# Patient Record
Sex: MALE | Race: ASIAN | Employment: OTHER | URBAN - METROPOLITAN AREA
[De-identification: names, ages, dates, MRNs, and addresses within clinical notes are randomized per-mention and may not be internally consistent; named-entity substitution may affect disease eponyms.]

---

## 2021-01-25 ENCOUNTER — TELEPHONE (OUTPATIENT)
Dept: PREADMISSION TESTING | Age: 76
End: 2021-01-25

## 2021-01-25 NOTE — TELEPHONE ENCOUNTER
Spoke with patient's daughter. She received a phone call from our hospital through a chinese  and was not able to understand the message. Patient speaks Darryl Reyes. His direct phone number is [phone].

## 2021-01-26 ENCOUNTER — TRANSCRIBE ORDER (OUTPATIENT)
Dept: REGISTRATION | Age: 76
End: 2021-01-26

## 2021-01-26 ENCOUNTER — HOSPITAL ENCOUNTER (OUTPATIENT)
Dept: PREADMISSION TESTING | Age: 76
Discharge: HOME | End: 2021-01-26
Payer: MEDICARE

## 2021-01-26 DIAGNOSIS — M17.11 OSTEOARTHRITIS OF RIGHT KNEE: Primary | ICD-10-CM

## 2021-01-26 DIAGNOSIS — M17.11 OSTEOARTHRITIS OF RIGHT KNEE: ICD-10-CM

## 2021-01-26 LAB
ALBUMIN SERPL-MCNC: 3.9 G/DL (ref 3.4–5)
ALBUMIN/GLOB SERPL: 1.2 {RATIO} (ref 0.8–1.7)
ALP SERPL-CCNC: 48 U/L (ref 45–117)
ALT SERPL-CCNC: 29 U/L (ref 16–61)
ANION GAP SERPL CALC-SCNC: 0 MMOL/L (ref 3–18)
APPEARANCE UR: CLEAR
APTT PPP: 40.1 SEC (ref 23–36.4)
AST SERPL-CCNC: 21 U/L (ref 10–38)
ATRIAL RATE: 59 BPM
BASOPHILS # BLD: 0 K/UL (ref 0–0.1)
BASOPHILS NFR BLD: 0 % (ref 0–2)
BILIRUB SERPL-MCNC: 0.5 MG/DL (ref 0.2–1)
BILIRUB UR QL: NEGATIVE
BUN SERPL-MCNC: 15 MG/DL (ref 7–18)
BUN/CREAT SERPL: 14 (ref 12–20)
CALCIUM SERPL-MCNC: 8.9 MG/DL (ref 8.5–10.1)
CALCULATED P AXIS, ECG09: 48 DEGREES
CALCULATED R AXIS, ECG10: 6 DEGREES
CALCULATED T AXIS, ECG11: -16 DEGREES
CHLORIDE SERPL-SCNC: 108 MMOL/L (ref 100–111)
CO2 SERPL-SCNC: 34 MMOL/L (ref 21–32)
COLOR UR: NORMAL
CREAT SERPL-MCNC: 1.07 MG/DL (ref 0.6–1.3)
DIAGNOSIS, 93000: NORMAL
DIFFERENTIAL METHOD BLD: ABNORMAL
EOSINOPHIL # BLD: 0.3 K/UL (ref 0–0.4)
EOSINOPHIL NFR BLD: 5 % (ref 0–5)
ERYTHROCYTE [DISTWIDTH] IN BLOOD BY AUTOMATED COUNT: 12.8 % (ref 11.6–14.5)
ERYTHROCYTE [SEDIMENTATION RATE] IN BLOOD: 4 MM/HR (ref 0–20)
EST. AVERAGE GLUCOSE BLD GHB EST-MCNC: 120 MG/DL
GLOBULIN SER CALC-MCNC: 3.3 G/DL (ref 2–4)
GLUCOSE SERPL-MCNC: 79 MG/DL (ref 74–99)
GLUCOSE UR STRIP.AUTO-MCNC: NEGATIVE MG/DL
HBA1C MFR BLD: 5.8 % (ref 4.2–5.6)
HCT VFR BLD AUTO: 41.7 % (ref 36–48)
HGB BLD-MCNC: 13.5 G/DL (ref 13–16)
HGB UR QL STRIP: NEGATIVE
INR PPP: 1 (ref 0.8–1.2)
KETONES UR QL STRIP.AUTO: NEGATIVE MG/DL
LEUKOCYTE ESTERASE UR QL STRIP.AUTO: NEGATIVE
LYMPHOCYTES # BLD: 1.9 K/UL (ref 0.9–3.6)
LYMPHOCYTES NFR BLD: 33 % (ref 21–52)
MCH RBC QN AUTO: 30.2 PG (ref 24–34)
MCHC RBC AUTO-ENTMCNC: 32.4 G/DL (ref 31–37)
MCV RBC AUTO: 93.3 FL (ref 74–97)
MONOCYTES # BLD: 0.6 K/UL (ref 0.05–1.2)
MONOCYTES NFR BLD: 10 % (ref 3–10)
NEUTS SEG # BLD: 2.9 K/UL (ref 1.8–8)
NEUTS SEG NFR BLD: 52 % (ref 40–73)
NITRITE UR QL STRIP.AUTO: NEGATIVE
P-R INTERVAL, ECG05: 160 MS
PH UR STRIP: 6.5 [PH] (ref 5–8)
PLATELET # BLD AUTO: 138 K/UL (ref 135–420)
PMV BLD AUTO: 10.6 FL (ref 9.2–11.8)
POTASSIUM SERPL-SCNC: 4.1 MMOL/L (ref 3.5–5.5)
PROT SERPL-MCNC: 7.2 G/DL (ref 6.4–8.2)
PROT UR STRIP-MCNC: NEGATIVE MG/DL
PROTHROMBIN TIME: 13.5 SEC (ref 11.5–15.2)
Q-T INTERVAL, ECG07: 408 MS
QRS DURATION, ECG06: 114 MS
QTC CALCULATION (BEZET), ECG08: 403 MS
RBC # BLD AUTO: 4.47 M/UL (ref 4.7–5.5)
SODIUM SERPL-SCNC: 142 MMOL/L (ref 136–145)
SP GR UR REFRACTOMETRY: 1.01 (ref 1–1.03)
UROBILINOGEN UR QL STRIP.AUTO: 1 EU/DL (ref 0.2–1)
VENTRICULAR RATE, ECG03: 59 BPM
WBC # BLD AUTO: 5.7 K/UL (ref 4.6–13.2)

## 2021-01-26 PROCEDURE — 83036 HEMOGLOBIN GLYCOSYLATED A1C: CPT

## 2021-01-26 PROCEDURE — 85652 RBC SED RATE AUTOMATED: CPT

## 2021-01-26 PROCEDURE — 87086 URINE CULTURE/COLONY COUNT: CPT

## 2021-01-26 PROCEDURE — 80053 COMPREHEN METABOLIC PANEL: CPT

## 2021-01-26 PROCEDURE — 81003 URINALYSIS AUTO W/O SCOPE: CPT

## 2021-01-26 PROCEDURE — 85610 PROTHROMBIN TIME: CPT

## 2021-01-26 PROCEDURE — 85730 THROMBOPLASTIN TIME PARTIAL: CPT

## 2021-01-26 PROCEDURE — 93005 ELECTROCARDIOGRAM TRACING: CPT

## 2021-01-26 PROCEDURE — 85025 COMPLETE CBC W/AUTO DIFF WBC: CPT

## 2021-01-27 LAB
BACTERIA SPEC CULT: ABNORMAL
BACTERIA SPEC CULT: ABNORMAL
BACTERIA SPEC CULT: NORMAL
SERVICE CMNT-IMP: ABNORMAL
SERVICE CMNT-IMP: NORMAL

## 2021-02-09 ENCOUNTER — HOSPITAL ENCOUNTER (OUTPATIENT)
Dept: PREADMISSION TESTING | Age: 76
Discharge: HOME | End: 2021-02-09
Payer: MEDICARE

## 2021-02-09 PROCEDURE — U0003 INFECTIOUS AGENT DETECTION BY NUCLEIC ACID (DNA OR RNA); SEVERE ACUTE RESPIRATORY SYNDROME CORONAVIRUS 2 (SARS-COV-2) (CORONAVIRUS DISEASE [COVID-19]), AMPLIFIED PROBE TECHNIQUE, MAKING USE OF HIGH THROUGHPUT TECHNOLOGIES AS DESCRIBED BY CMS-2020-01-R: HCPCS

## 2021-02-10 LAB — SARS-COV-2, COV2NT: NOT DETECTED

## 2021-02-11 PROBLEM — M17.11 OSTEOARTHRITIS OF RIGHT KNEE: Chronic | Status: ACTIVE | Noted: 2021-02-11

## 2021-02-11 NOTE — H&P
[address] [facility] [address] Medicine  History and Physical Exam    Patient: In Kassi Banuelos MRN: [id number]  SSN: [id number]    YOB: 1945  Age: 67 y.o. Sex: male      Subjective:      Chief Complaint: Right knee pain    History of Present Illness:  Patient complains of pain to the right knee and difficulty ambulating, which has progressively worsened over several months. X-rays showed osteoarthritis of the joint. The patient's pain has persisted and progressed despite conservative treatments and therapies. The patient has been previously treated with medications/injections. The patient has at this time opted for surgical intervention. Past Medical History:   Diagnosis Date    Arthritis     GERD (gastroesophageal reflux disease)     High cholesterol     controlled with medication    Osteoarthritis of right knee 2/11/2021     No past surgical history on file. Social History     Occupational History    Not on file   Tobacco Use    Smoking status: Never Smoker    Smokeless tobacco: Never Used   Substance and Sexual Activity    Alcohol use: Not Currently    Drug use: Not Currently    Sexual activity: Not on file     Prior to Admission medications    Medication Sig Start Date End Date Taking? Authorizing Provider   omeprazole (PRILOSEC) 20 mg capsule TAKE 1 CAPSULE BY MOUTH EVERY DAY 1/18/21   Provider, Historical   pravastatin (PRAVACHOL) 80 mg tablet Take 80 mg by mouth daily. 1/18/21   Provider, Historical   multivitamin (ONE A DAY) tablet Take 1 Tab by mouth daily. Provider, Historical   krill oil 500 mg cap Take  by mouth. Provider, Historical       Allergies: No Known Allergies     Review of Systems:  Pertinent items are noted in the History of Present Illness. Objective:       Physical Exam:  HEENT: Normocephalic, atraumatic  Lungs:  Clear to auscultation  Heart:   Regular rate and rhythm  Abdomen: Soft  Extremities:  Pain with range of motion of the right knee. Active ROM limited due to pain. Tenderness generalized. Crepitus present. Antalgic gait. Assessment:      Arthritis of the right knee. Plan:       Proceed with scheduled RIGHT TOTAL KNEE ARTHROPLASTY. The various methods of treatment have been discussed with the patient and family. After consideration of risks, benefits, and other options for treatment, the patient has consented to surgical interventions. Questions were answered and preoperative teaching was done by Dr Laila Tamez.      Signed By: Jeromy Gallardo     February 11, 2021

## 2021-02-15 ENCOUNTER — HOME HEALTH ADMISSION (OUTPATIENT)
Dept: HOME HEALTH SERVICES | Facility: HOME HEALTH | Age: 76
End: 2021-02-15
Payer: MEDICARE

## 2021-02-15 ENCOUNTER — HOSPITAL ENCOUNTER (OUTPATIENT)
Age: 76
Discharge: HOME HEALTH | End: 2021-02-15
Attending: ORTHOPAEDIC SURGERY | Admitting: ORTHOPAEDIC SURGERY
Payer: MEDICARE

## 2021-02-15 ENCOUNTER — ANESTHESIA EVENT (OUTPATIENT)
Dept: SURGERY | Age: 76
End: 2021-02-15
Payer: MEDICARE

## 2021-02-15 ENCOUNTER — APPOINTMENT (OUTPATIENT)
Dept: GENERAL RADIOLOGY | Age: 76
End: 2021-02-15
Attending: PHYSICIAN ASSISTANT
Payer: MEDICARE

## 2021-02-15 ENCOUNTER — ANESTHESIA (OUTPATIENT)
Dept: SURGERY | Age: 76
End: 2021-02-15
Payer: MEDICARE

## 2021-02-15 VITALS
DIASTOLIC BLOOD PRESSURE: 83 MMHG | TEMPERATURE: 97.1 F | WEIGHT: 178.4 LBS | HEART RATE: 87 BPM | OXYGEN SATURATION: 95 % | HEIGHT: 69 IN | RESPIRATION RATE: 12 BRPM | SYSTOLIC BLOOD PRESSURE: 150 MMHG | BODY MASS INDEX: 26.42 KG/M2

## 2021-02-15 DIAGNOSIS — M17.11 PRIMARY OSTEOARTHRITIS OF RIGHT KNEE: Primary | Chronic | ICD-10-CM

## 2021-02-15 LAB
ABO + RH BLD: NORMAL
BLOOD GROUP ANTIBODIES SERPL: NORMAL
GLUCOSE BLD STRIP.AUTO-MCNC: 104 MG/DL (ref 70–110)
SPECIMEN EXP DATE BLD: NORMAL

## 2021-02-15 PROCEDURE — 77030037713 HC CLOSR DEV INCIS ZIP STRY -B: Performed by: ORTHOPAEDIC SURGERY

## 2021-02-15 PROCEDURE — 77010033678 HC OXYGEN DAILY

## 2021-02-15 PROCEDURE — 77030003666 HC NDL SPINAL BD -A: Performed by: ORTHOPAEDIC SURGERY

## 2021-02-15 PROCEDURE — 97535 SELF CARE MNGMENT TRAINING: CPT

## 2021-02-15 PROCEDURE — 77030002934 HC SUT MCRYL J&J -B: Performed by: ORTHOPAEDIC SURGERY

## 2021-02-15 PROCEDURE — 77030033263 HC DRSG MEPILEX 16-48IN BORD MOLN -B: Performed by: ORTHOPAEDIC SURGERY

## 2021-02-15 PROCEDURE — C1776 JOINT DEVICE (IMPLANTABLE): HCPCS | Performed by: ORTHOPAEDIC SURGERY

## 2021-02-15 PROCEDURE — 74011250636 HC RX REV CODE- 250/636: Performed by: REGISTERED NURSE

## 2021-02-15 PROCEDURE — 74011250636 HC RX REV CODE- 250/636: Performed by: ORTHOPAEDIC SURGERY

## 2021-02-15 PROCEDURE — 74011000250 HC RX REV CODE- 250: Performed by: REGISTERED NURSE

## 2021-02-15 PROCEDURE — 77030016060 HC NDL NRV BLK TELE -A: Performed by: SPECIALIST

## 2021-02-15 PROCEDURE — 76060000034 HC ANESTHESIA 1.5 TO 2 HR: Performed by: ORTHOPAEDIC SURGERY

## 2021-02-15 PROCEDURE — 74011250637 HC RX REV CODE- 250/637: Performed by: PHYSICIAN ASSISTANT

## 2021-02-15 PROCEDURE — 77030012893: Performed by: ORTHOPAEDIC SURGERY

## 2021-02-15 PROCEDURE — 77030038010: Performed by: ORTHOPAEDIC SURGERY

## 2021-02-15 PROCEDURE — 97161 PT EVAL LOW COMPLEX 20 MIN: CPT

## 2021-02-15 PROCEDURE — 64450 NJX AA&/STRD OTHER PN/BRANCH: CPT | Performed by: SPECIALIST

## 2021-02-15 PROCEDURE — 86901 BLOOD TYPING SEROLOGIC RH(D): CPT

## 2021-02-15 PROCEDURE — 74011000258 HC RX REV CODE- 258: Performed by: REGISTERED NURSE

## 2021-02-15 PROCEDURE — 2709999900 HC NON-CHARGEABLE SUPPLY: Performed by: ORTHOPAEDIC SURGERY

## 2021-02-15 PROCEDURE — 77030011628: Performed by: ORTHOPAEDIC SURGERY

## 2021-02-15 PROCEDURE — 77030013708 HC HNDPC SUC IRR PULS STRY –B: Performed by: ORTHOPAEDIC SURGERY

## 2021-02-15 PROCEDURE — 77030020782 HC GWN BAIR PAWS FLX 3M -B: Performed by: ORTHOPAEDIC SURGERY

## 2021-02-15 PROCEDURE — 77030039760: Performed by: ORTHOPAEDIC SURGERY

## 2021-02-15 PROCEDURE — 82962 GLUCOSE BLOOD TEST: CPT

## 2021-02-15 PROCEDURE — 97165 OT EVAL LOW COMPLEX 30 MIN: CPT

## 2021-02-15 PROCEDURE — 76010000153 HC OR TIME 1.5 TO 2 HR: Performed by: ORTHOPAEDIC SURGERY

## 2021-02-15 PROCEDURE — 74011000258 HC RX REV CODE- 258: Performed by: ORTHOPAEDIC SURGERY

## 2021-02-15 PROCEDURE — 76942 ECHO GUIDE FOR BIOPSY: CPT | Performed by: ORTHOPAEDIC SURGERY

## 2021-02-15 PROCEDURE — 77030031139 HC SUT VCRL2 J&J -A: Performed by: ORTHOPAEDIC SURGERY

## 2021-02-15 PROCEDURE — 74011250637 HC RX REV CODE- 250/637: Performed by: SPECIALIST

## 2021-02-15 PROCEDURE — 97116 GAIT TRAINING THERAPY: CPT

## 2021-02-15 PROCEDURE — 77030034694 HC SCPL CANADY PLSM DISP USMD -E: Performed by: ORTHOPAEDIC SURGERY

## 2021-02-15 PROCEDURE — 74011250636 HC RX REV CODE- 250/636: Performed by: PHYSICIAN ASSISTANT

## 2021-02-15 PROCEDURE — 74011250636 HC RX REV CODE- 250/636: Performed by: SPECIALIST

## 2021-02-15 PROCEDURE — 74011000250 HC RX REV CODE- 250: Performed by: SPECIALIST

## 2021-02-15 PROCEDURE — C1713 ANCHOR/SCREW BN/BN,TIS/BN: HCPCS | Performed by: ORTHOPAEDIC SURGERY

## 2021-02-15 PROCEDURE — 77030027138 HC INCENT SPIROMETER -A: Performed by: ORTHOPAEDIC SURGERY

## 2021-02-15 PROCEDURE — 74011000250 HC RX REV CODE- 250: Performed by: ORTHOPAEDIC SURGERY

## 2021-02-15 PROCEDURE — 74011250637 HC RX REV CODE- 250/637: Performed by: ORTHOPAEDIC SURGERY

## 2021-02-15 PROCEDURE — 73560 X-RAY EXAM OF KNEE 1 OR 2: CPT

## 2021-02-15 PROCEDURE — 76210000016 HC OR PH I REC 1 TO 1.5 HR: Performed by: ORTHOPAEDIC SURGERY

## 2021-02-15 PROCEDURE — 36415 COLL VENOUS BLD VENIPUNCTURE: CPT

## 2021-02-15 DEVICE — SIZE 6 9MM TIBIAL INSERT CR
Type: IMPLANTABLE DEVICE | Site: KNEE | Status: FUNCTIONAL
Brand: BKS E-VITALIZE

## 2021-02-15 DEVICE — CEMENT BNE 40GM FULL DOSE PMMA W/O ANTIBIO HI VISC N RADPQ: Type: IMPLANTABLE DEVICE | Site: KNEE | Status: FUNCTIONAL

## 2021-02-15 DEVICE — 35MM PATELLA, VITAMIN E
Type: IMPLANTABLE DEVICE | Site: KNEE | Status: FUNCTIONAL
Brand: BKS E-VITALIZE

## 2021-02-15 DEVICE — KNEE K1 TOT HEMI STD CEM IMPL CAPPED K1 OD: Type: IMPLANTABLE DEVICE | Site: KNEE | Status: FUNCTIONAL

## 2021-02-15 DEVICE — RT SIZE 6 FEMORAL CR NONPOROUS
Type: IMPLANTABLE DEVICE | Site: KNEE | Status: FUNCTIONAL
Brand: BKS TRIMAX

## 2021-02-15 DEVICE — SIZE 6 TIBIAL TRAY NONPOROUS
Type: IMPLANTABLE DEVICE | Site: KNEE | Status: FUNCTIONAL
Brand: BALANCED KNEE SYSTEM

## 2021-02-15 RX ORDER — HYDROMORPHONE HYDROCHLORIDE 1 MG/ML
0.5 INJECTION, SOLUTION INTRAMUSCULAR; INTRAVENOUS; SUBCUTANEOUS
Status: DISCONTINUED | OUTPATIENT
Start: 2021-02-15 | End: 2021-02-15 | Stop reason: HOSPADM

## 2021-02-15 RX ORDER — TRANEXAMIC ACID 650 1/1
1950 TABLET ORAL ONCE
Status: COMPLETED | OUTPATIENT
Start: 2021-02-15 | End: 2021-02-15

## 2021-02-15 RX ORDER — NALOXONE HYDROCHLORIDE 0.4 MG/ML
0.1 INJECTION, SOLUTION INTRAMUSCULAR; INTRAVENOUS; SUBCUTANEOUS
Status: DISCONTINUED | OUTPATIENT
Start: 2021-02-15 | End: 2021-02-15 | Stop reason: HOSPADM

## 2021-02-15 RX ORDER — CEFADROXIL 500 MG/1
500 CAPSULE ORAL 2 TIMES DAILY
Qty: 10 CAP | Refills: 0 | Status: SHIPPED | OUTPATIENT
Start: 2021-02-15 | End: 2021-02-20

## 2021-02-15 RX ORDER — ASPIRIN 81 MG/1
81 TABLET ORAL 2 TIMES DAILY
Qty: 42 TAB | Refills: 0 | Status: SHIPPED | OUTPATIENT
Start: 2021-02-15 | End: 2021-03-08

## 2021-02-15 RX ORDER — ACETAMINOPHEN 325 MG/1
650 TABLET ORAL EVERY 6 HOURS
Status: DISCONTINUED | OUTPATIENT
Start: 2021-02-15 | End: 2021-02-15 | Stop reason: HOSPADM

## 2021-02-15 RX ORDER — EPHEDRINE SULFATE/0.9% NACL/PF 50 MG/5 ML
SYRINGE (ML) INTRAVENOUS AS NEEDED
Status: DISCONTINUED | OUTPATIENT
Start: 2021-02-15 | End: 2021-02-15 | Stop reason: HOSPADM

## 2021-02-15 RX ORDER — HYDROMORPHONE HYDROCHLORIDE 2 MG/ML
INJECTION, SOLUTION INTRAMUSCULAR; INTRAVENOUS; SUBCUTANEOUS AS NEEDED
Status: DISCONTINUED | OUTPATIENT
Start: 2021-02-15 | End: 2021-02-15 | Stop reason: HOSPADM

## 2021-02-15 RX ORDER — PROPOFOL 10 MG/ML
INJECTION, EMULSION INTRAVENOUS AS NEEDED
Status: DISCONTINUED | OUTPATIENT
Start: 2021-02-15 | End: 2021-02-15 | Stop reason: HOSPADM

## 2021-02-15 RX ORDER — VANCOMYCIN HYDROCHLORIDE
1250 EVERY 12 HOURS
Status: DISCONTINUED | OUTPATIENT
Start: 2021-02-16 | End: 2021-02-15 | Stop reason: HOSPADM

## 2021-02-15 RX ORDER — OXYCODONE HYDROCHLORIDE 5 MG/1
5 TABLET ORAL
Qty: 42 TAB | Refills: 0 | Status: SHIPPED | OUTPATIENT
Start: 2021-02-15 | End: 2021-02-22

## 2021-02-15 RX ORDER — ONDANSETRON 2 MG/ML
4 INJECTION INTRAMUSCULAR; INTRAVENOUS
Status: DISCONTINUED | OUTPATIENT
Start: 2021-02-15 | End: 2021-02-15 | Stop reason: HOSPADM

## 2021-02-15 RX ORDER — KETAMINE HCL IN 0.9 % NACL 50 MG/5 ML
SYRINGE (ML) INTRAVENOUS AS NEEDED
Status: DISCONTINUED | OUTPATIENT
Start: 2021-02-15 | End: 2021-02-15 | Stop reason: HOSPADM

## 2021-02-15 RX ORDER — NALOXONE HYDROCHLORIDE 0.4 MG/ML
0.4 INJECTION, SOLUTION INTRAMUSCULAR; INTRAVENOUS; SUBCUTANEOUS AS NEEDED
Status: DISCONTINUED | OUTPATIENT
Start: 2021-02-15 | End: 2021-02-15 | Stop reason: HOSPADM

## 2021-02-15 RX ORDER — ROPIVACAINE HYDROCHLORIDE 5 MG/ML
INJECTION, SOLUTION EPIDURAL; INFILTRATION; PERINEURAL
Status: SHIPPED | OUTPATIENT
Start: 2021-02-15 | End: 2021-02-15

## 2021-02-15 RX ORDER — DEXMEDETOMIDINE HYDROCHLORIDE 100 UG/ML
INJECTION, SOLUTION INTRAVENOUS AS NEEDED
Status: DISCONTINUED | OUTPATIENT
Start: 2021-02-15 | End: 2021-02-15 | Stop reason: HOSPADM

## 2021-02-15 RX ORDER — VANCOMYCIN HYDROCHLORIDE
1250 ONCE
Status: COMPLETED | OUTPATIENT
Start: 2021-02-15 | End: 2021-02-15

## 2021-02-15 RX ORDER — CEFAZOLIN SODIUM/WATER 2 G/20 ML
2 SYRINGE (ML) INTRAVENOUS EVERY 8 HOURS
Status: DISCONTINUED | OUTPATIENT
Start: 2021-02-15 | End: 2021-02-15 | Stop reason: HOSPADM

## 2021-02-15 RX ORDER — DOCUSATE SODIUM 100 MG/1
100 CAPSULE, LIQUID FILLED ORAL DAILY
Status: DISCONTINUED | OUTPATIENT
Start: 2021-02-16 | End: 2021-02-15 | Stop reason: HOSPADM

## 2021-02-15 RX ORDER — CEFAZOLIN SODIUM/WATER 2 G/20 ML
2 SYRINGE (ML) INTRAVENOUS ONCE
Status: COMPLETED | OUTPATIENT
Start: 2021-02-15 | End: 2021-02-15

## 2021-02-15 RX ORDER — ZOLPIDEM TARTRATE 5 MG/1
5-10 TABLET ORAL
Status: DISCONTINUED | OUTPATIENT
Start: 2021-02-15 | End: 2021-02-15 | Stop reason: HOSPADM

## 2021-02-15 RX ORDER — SODIUM CHLORIDE 0.9 % (FLUSH) 0.9 %
5-40 SYRINGE (ML) INJECTION EVERY 8 HOURS
Status: DISCONTINUED | OUTPATIENT
Start: 2021-02-15 | End: 2021-02-15 | Stop reason: HOSPADM

## 2021-02-15 RX ORDER — FENTANYL CITRATE 50 UG/ML
25 INJECTION, SOLUTION INTRAMUSCULAR; INTRAVENOUS
Status: DISCONTINUED | OUTPATIENT
Start: 2021-02-15 | End: 2021-02-15 | Stop reason: HOSPADM

## 2021-02-15 RX ORDER — CELECOXIB 100 MG/1
200 CAPSULE ORAL ONCE
Status: COMPLETED | OUTPATIENT
Start: 2021-02-15 | End: 2021-02-15

## 2021-02-15 RX ORDER — OXYCODONE HYDROCHLORIDE 5 MG/1
5-10 TABLET ORAL
Status: DISCONTINUED | OUTPATIENT
Start: 2021-02-15 | End: 2021-02-15 | Stop reason: HOSPADM

## 2021-02-15 RX ORDER — METOCLOPRAMIDE HYDROCHLORIDE 5 MG/ML
INJECTION INTRAMUSCULAR; INTRAVENOUS AS NEEDED
Status: DISCONTINUED | OUTPATIENT
Start: 2021-02-15 | End: 2021-02-15 | Stop reason: HOSPADM

## 2021-02-15 RX ORDER — ONDANSETRON 2 MG/ML
4 INJECTION INTRAMUSCULAR; INTRAVENOUS ONCE
Status: DISCONTINUED | OUTPATIENT
Start: 2021-02-15 | End: 2021-02-15 | Stop reason: HOSPADM

## 2021-02-15 RX ORDER — SODIUM CHLORIDE 9 MG/ML
300 INJECTION, SOLUTION INTRAVENOUS CONTINUOUS
Status: DISCONTINUED | OUTPATIENT
Start: 2021-02-15 | End: 2021-02-15 | Stop reason: HOSPADM

## 2021-02-15 RX ORDER — SODIUM CHLORIDE 9 MG/ML
125 INJECTION, SOLUTION INTRAVENOUS CONTINUOUS
Status: DISCONTINUED | OUTPATIENT
Start: 2021-02-15 | End: 2021-02-15 | Stop reason: HOSPADM

## 2021-02-15 RX ORDER — MELOXICAM 7.5 MG/1
7.5 TABLET ORAL 2 TIMES DAILY
Qty: 28 TAB | Refills: 0 | Status: SHIPPED | OUTPATIENT
Start: 2021-02-15 | End: 2021-03-01

## 2021-02-15 RX ORDER — DEXAMETHASONE SODIUM PHOSPHATE 4 MG/ML
8 INJECTION, SOLUTION INTRA-ARTICULAR; INTRALESIONAL; INTRAMUSCULAR; INTRAVENOUS; SOFT TISSUE ONCE
Status: COMPLETED | OUTPATIENT
Start: 2021-02-15 | End: 2021-02-15

## 2021-02-15 RX ORDER — SODIUM CHLORIDE 0.9 % (FLUSH) 0.9 %
5-40 SYRINGE (ML) INJECTION AS NEEDED
Status: DISCONTINUED | OUTPATIENT
Start: 2021-02-15 | End: 2021-02-15 | Stop reason: HOSPADM

## 2021-02-15 RX ORDER — LIDOCAINE HYDROCHLORIDE 20 MG/ML
INJECTION, SOLUTION EPIDURAL; INFILTRATION; INTRACAUDAL; PERINEURAL AS NEEDED
Status: DISCONTINUED | OUTPATIENT
Start: 2021-02-15 | End: 2021-02-15 | Stop reason: HOSPADM

## 2021-02-15 RX ORDER — ROPIVACAINE HYDROCHLORIDE 2 MG/ML
INJECTION, SOLUTION EPIDURAL; INFILTRATION; PERINEURAL AS NEEDED
Status: DISCONTINUED | OUTPATIENT
Start: 2021-02-15 | End: 2021-02-15 | Stop reason: HOSPADM

## 2021-02-15 RX ORDER — MIDAZOLAM HYDROCHLORIDE 1 MG/ML
INJECTION, SOLUTION INTRAMUSCULAR; INTRAVENOUS AS NEEDED
Status: DISCONTINUED | OUTPATIENT
Start: 2021-02-15 | End: 2021-02-15 | Stop reason: HOSPADM

## 2021-02-15 RX ORDER — METOCLOPRAMIDE HYDROCHLORIDE 5 MG/ML
10 INJECTION INTRAMUSCULAR; INTRAVENOUS
Status: DISCONTINUED | OUTPATIENT
Start: 2021-02-15 | End: 2021-02-15 | Stop reason: HOSPADM

## 2021-02-15 RX ORDER — ROPIVACAINE HYDROCHLORIDE 5 MG/ML
INJECTION, SOLUTION EPIDURAL; INFILTRATION; PERINEURAL AS NEEDED
Status: DISCONTINUED | OUTPATIENT
Start: 2021-02-15 | End: 2021-02-15 | Stop reason: HOSPADM

## 2021-02-15 RX ORDER — DIPHENHYDRAMINE HYDROCHLORIDE 50 MG/ML
12.5 INJECTION, SOLUTION INTRAMUSCULAR; INTRAVENOUS
Status: DISCONTINUED | OUTPATIENT
Start: 2021-02-15 | End: 2021-02-15 | Stop reason: HOSPADM

## 2021-02-15 RX ORDER — KETOROLAC TROMETHAMINE 15 MG/ML
15 INJECTION, SOLUTION INTRAMUSCULAR; INTRAVENOUS EVERY 6 HOURS
Status: DISCONTINUED | OUTPATIENT
Start: 2021-02-15 | End: 2021-02-15 | Stop reason: HOSPADM

## 2021-02-15 RX ORDER — GLYCOPYRROLATE 0.2 MG/ML
INJECTION INTRAMUSCULAR; INTRAVENOUS AS NEEDED
Status: DISCONTINUED | OUTPATIENT
Start: 2021-02-15 | End: 2021-02-15 | Stop reason: HOSPADM

## 2021-02-15 RX ORDER — SODIUM CHLORIDE, SODIUM LACTATE, POTASSIUM CHLORIDE, CALCIUM CHLORIDE 600; 310; 30; 20 MG/100ML; MG/100ML; MG/100ML; MG/100ML
50 INJECTION, SOLUTION INTRAVENOUS CONTINUOUS
Status: DISCONTINUED | OUTPATIENT
Start: 2021-02-15 | End: 2021-02-15 | Stop reason: HOSPADM

## 2021-02-15 RX ORDER — SODIUM CHLORIDE, SODIUM LACTATE, POTASSIUM CHLORIDE, CALCIUM CHLORIDE 600; 310; 30; 20 MG/100ML; MG/100ML; MG/100ML; MG/100ML
125 INJECTION, SOLUTION INTRAVENOUS CONTINUOUS
Status: DISCONTINUED | OUTPATIENT
Start: 2021-02-15 | End: 2021-02-15 | Stop reason: HOSPADM

## 2021-02-15 RX ORDER — ASPIRIN 81 MG/1
81 TABLET ORAL 2 TIMES DAILY
Status: DISCONTINUED | OUTPATIENT
Start: 2021-02-15 | End: 2021-02-15 | Stop reason: HOSPADM

## 2021-02-15 RX ORDER — PANTOPRAZOLE SODIUM 40 MG/1
40 TABLET, DELAYED RELEASE ORAL DAILY
Status: DISCONTINUED | OUTPATIENT
Start: 2021-02-15 | End: 2021-02-15 | Stop reason: HOSPADM

## 2021-02-15 RX ORDER — ONDANSETRON 2 MG/ML
INJECTION INTRAMUSCULAR; INTRAVENOUS AS NEEDED
Status: DISCONTINUED | OUTPATIENT
Start: 2021-02-15 | End: 2021-02-15 | Stop reason: HOSPADM

## 2021-02-15 RX ORDER — LANOLIN ALCOHOL/MO/W.PET/CERES
1 CREAM (GRAM) TOPICAL 3 TIMES DAILY
Status: DISCONTINUED | OUTPATIENT
Start: 2021-02-15 | End: 2021-02-15 | Stop reason: HOSPADM

## 2021-02-15 RX ORDER — ACETAMINOPHEN 500 MG
1000 TABLET ORAL ONCE
Status: COMPLETED | OUTPATIENT
Start: 2021-02-15 | End: 2021-02-15

## 2021-02-15 RX ADMIN — PHENYLEPHRINE HYDROCHLORIDE 100 MCG: 10 INJECTION INTRAVENOUS at 13:53

## 2021-02-15 RX ADMIN — Medication 30 MG: at 12:52

## 2021-02-15 RX ADMIN — ONDANSETRON HYDROCHLORIDE 4 MG: 2 INJECTION INTRAMUSCULAR; INTRAVENOUS at 13:10

## 2021-02-15 RX ADMIN — Medication 20 MG: at 13:17

## 2021-02-15 RX ADMIN — DEXMEDETOMIDINE HYDROCHLORIDE 20 MCG: 100 INJECTION, SOLUTION INTRAVENOUS at 12:09

## 2021-02-15 RX ADMIN — MIDAZOLAM 2 MG: 1 INJECTION INTRAMUSCULAR; INTRAVENOUS at 12:05

## 2021-02-15 RX ADMIN — SODIUM CHLORIDE, SODIUM LACTATE, POTASSIUM CHLORIDE, AND CALCIUM CHLORIDE 1000 ML: 600; 310; 30; 20 INJECTION, SOLUTION INTRAVENOUS at 10:52

## 2021-02-15 RX ADMIN — PHENYLEPHRINE HYDROCHLORIDE 200 MCG: 10 INJECTION INTRAVENOUS at 13:59

## 2021-02-15 RX ADMIN — PROPOFOL 150 MG: 10 INJECTION, EMULSION INTRAVENOUS at 12:52

## 2021-02-15 RX ADMIN — SODIUM CHLORIDE, SODIUM LACTATE, POTASSIUM CHLORIDE, AND CALCIUM CHLORIDE 125 ML/HR: 600; 310; 30; 20 INJECTION, SOLUTION INTRAVENOUS at 10:52

## 2021-02-15 RX ADMIN — DEXMEDETOMIDINE HYDROCHLORIDE 8 MCG: 100 INJECTION, SOLUTION INTRAVENOUS at 13:30

## 2021-02-15 RX ADMIN — HYDROMORPHONE HYDROCHLORIDE 0.5 MG: 2 INJECTION, SOLUTION INTRAMUSCULAR; INTRAVENOUS; SUBCUTANEOUS at 13:28

## 2021-02-15 RX ADMIN — ROPIVACAINE HYDROCHLORIDE 22 ML: 5 INJECTION, SOLUTION EPIDURAL; INFILTRATION; PERINEURAL at 12:20

## 2021-02-15 RX ADMIN — TRANEXAMIC ACID 1950 MG: 650 TABLET ORAL at 10:40

## 2021-02-15 RX ADMIN — DEXAMETHASONE SODIUM PHOSPHATE 8 MG: 4 INJECTION, SOLUTION INTRAMUSCULAR; INTRAVENOUS at 10:52

## 2021-02-15 RX ADMIN — PHENYLEPHRINE HYDROCHLORIDE 100 MCG: 10 INJECTION INTRAVENOUS at 14:09

## 2021-02-15 RX ADMIN — GLYCOPYRROLATE 0.2 MG: 0.2 INJECTION INTRAMUSCULAR; INTRAVENOUS at 13:02

## 2021-02-15 RX ADMIN — ACETAMINOPHEN 1000 MG: 500 TABLET ORAL at 10:40

## 2021-02-15 RX ADMIN — VANCOMYCIN HYDROCHLORIDE 1250 MG: 10 INJECTION, POWDER, LYOPHILIZED, FOR SOLUTION INTRAVENOUS at 12:47

## 2021-02-15 RX ADMIN — CELECOXIB 200 MG: 100 CAPSULE ORAL at 11:36

## 2021-02-15 RX ADMIN — ROPIVACAINE HYDROCHLORIDE 22 ML: 5 INJECTION, SOLUTION EPIDURAL; INFILTRATION; PERINEURAL at 12:09

## 2021-02-15 RX ADMIN — HYDROMORPHONE HYDROCHLORIDE 0.5 MG: 2 INJECTION, SOLUTION INTRAMUSCULAR; INTRAVENOUS; SUBCUTANEOUS at 13:19

## 2021-02-15 RX ADMIN — CEFAZOLIN 2 G: 10 INJECTION, POWDER, FOR SOLUTION INTRAVENOUS at 12:19

## 2021-02-15 RX ADMIN — ROPIVACAINE HYDROCHLORIDE 20 ML: 2 INJECTION, SOLUTION EPIDURAL; INFILTRATION; PERINEURAL at 12:16

## 2021-02-15 RX ADMIN — METOCLOPRAMIDE 5 MG: 5 INJECTION, SOLUTION INTRAMUSCULAR; INTRAVENOUS at 13:03

## 2021-02-15 RX ADMIN — SODIUM CHLORIDE, SODIUM LACTATE, POTASSIUM CHLORIDE, AND CALCIUM CHLORIDE: 600; 310; 30; 20 INJECTION, SOLUTION INTRAVENOUS at 13:25

## 2021-02-15 RX ADMIN — DEXMEDETOMIDINE HYDROCHLORIDE 8 MCG: 100 INJECTION, SOLUTION INTRAVENOUS at 13:22

## 2021-02-15 RX ADMIN — DEXMEDETOMIDINE HYDROCHLORIDE 8 MCG: 100 INJECTION, SOLUTION INTRAVENOUS at 13:15

## 2021-02-15 RX ADMIN — LIDOCAINE HYDROCHLORIDE 100 MG: 20 INJECTION, SOLUTION EPIDURAL; INFILTRATION; INTRACAUDAL; PERINEURAL at 12:52

## 2021-02-15 RX ADMIN — Medication 5 MG: at 14:16

## 2021-02-15 RX ADMIN — MIDAZOLAM 2 MG: 1 INJECTION INTRAMUSCULAR; INTRAVENOUS at 12:52

## 2021-02-15 RX ADMIN — PANTOPRAZOLE SODIUM 40 MG: 40 TABLET, DELAYED RELEASE ORAL at 10:40

## 2021-02-15 NOTE — ANESTHESIA PREPROCEDURE EVALUATION
Relevant Problems   No relevant active problems       Anesthetic History   No history of anesthetic complications            Review of Systems / Medical History  Patient summary reviewed, nursing notes reviewed and pertinent labs reviewed    Pulmonary  Within defined limits                 Neuro/Psych   Within defined limits           Cardiovascular              Hyperlipidemia    Exercise tolerance: >4 METS     GI/Hepatic/Renal     GERD           Endo/Other        Arthritis     Other Findings              Physical Exam    Airway  Mallampati: II  TM Distance: 4 - 6 cm  Neck ROM: normal range of motion   Mouth opening: Normal     Cardiovascular               Dental  No notable dental hx       Pulmonary                 Abdominal         Other Findings            Anesthetic Plan    ASA: 2  Anesthesia type: general      Post-op pain plan if not by surgeon: peripheral nerve block single      Anesthetic plan and risks discussed with: Patient      He understands English quite well and has no interest in Spinal.  Adductor Canal Block and iPACK block discussed with patient including risks/benefits - infection, nerve injury, bleeding, failed blocks - he wishes to proceed. I found an old EKG in [facility] which looks similar. Patient is active without cardiac symptoms and appears well optimized for procedure.

## 2021-02-15 NOTE — PERIOP NOTES
TRANSFER - OUT REPORT:    Verbal report given to RYDER Romero (name) on In [address] [facility] [address]  being transferred to [facility](unit) for routine post - op       Report consisted of patients Situation, Background, Assessment and   Recommendations(SBAR). Information from the following report(s) SBAR, Kardex, OR Summary, MAR and Cardiac Rhythm NSR was reviewed with the receiving nurse. Lines:   Peripheral IV 02/15/21 Right; Inner Forearm (Active)   Site Assessment Clean, dry, & intact 02/15/21 1450   Phlebitis Assessment 0 02/15/21 1450   Infiltration Assessment 0 02/15/21 1450   Dressing Status Clean, dry, & intact 02/15/21 1450   Dressing Type Tape;Transparent 02/15/21 1450   Hub Color/Line Status Green; Infusing;Patent 02/15/21 1450        Opportunity for questions and clarification was provided.       Patient transported with:   O2 @ 2 liters  Registered Nurse

## 2021-02-15 NOTE — PERIOP NOTES
Assuming care of patient, report received. Patient resting quietly, eyes closed and VSS. No distress noted, assessment completed.

## 2021-02-15 NOTE — PERIOP NOTES
Danielle Warren at bedside, aware pt completed the SSI bundle for positive MRSA in the nares, orders to start antibiotics in pre-op

## 2021-02-15 NOTE — INTERVAL H&P NOTE
Update History & Physical 
 
The Patient's History and Physical of February 11,2021,  
  was reviewed with the patient and I examined the patient. There was no change. The surgical site was confirmed by the patient and me. Plan:  The risk, benefits, expected outcome, and alternative to the recommended procedure have been discussed with the patient. Patient understands and wants to proceed with the procedure.  
 
Electronically signed by Ulises Regalado MD on 2/15/2021 at 10:33 AM

## 2021-02-15 NOTE — ANESTHESIA POSTPROCEDURE EVALUATION
Post-Anesthesia Evaluation and Assessment    Cardiovascular Function/Vital Signs  Visit Vitals  /74   Pulse 88   Temp 36.2 °C (97.1 °F)   Resp 11   Ht 5' 9\" (1.753 m)   Wt 80.9 kg (178 lb 6.4 oz)   SpO2 97%   BMI 26.35 kg/m²       Patient is status post Procedure(s):  RIGHT TOTAL KNEE REPLACEMENT, \"SPEC POP\" **CONTACT ISOLATIOIN**. Nausea/Vomiting: Controlled. Postoperative hydration reviewed and adequate. Pain:  Pain Scale 1: Visual (02/15/21 1450)  Pain Intensity 1: 0 (02/15/21 1450)   Managed. Neurological Status:   Neuro (WDL): Exceptions to WDL (02/15/21 1450)   At baseline. Mental Status and Level of Consciousness: Arousable. Pulmonary Status:   O2 Device: Non-rebreather mask (02/15/21 1427)   Adequate oxygenation and airway patent. Complications related to anesthesia: None    Post-anesthesia assessment completed. No concerns. Patient has met all discharge requirements.     Signed By: Erasto Guardado CRNA    February 15, 2021

## 2021-02-15 NOTE — PROGRESS NOTES
Problem: Mobility Impaired (Adult and Pediatric)  Goal: *Acute Goals and Plan of Care (Insert Text)  Description: Physical Therapy short term goals initiated 02/15/21, to be achieved in 2 days. Pt will:   1. Perform bed mobility with indep in prep for OOB activity. 2. Perform sit <> stand transfers with RW and S in prep for functional mobility and ambulation. 3. Ambulate 200 ft with RW and S in prep for household and community mobility. 4. Ascend/descend 2 stairs with 1 HR and S for safe home entry. Note: [x]  Patient has met MD sarah camacho for d/c home   [x]  Recommend HH with 24 hour adult care   []  Benefit from additional acute PT session to address:      PHYSICAL THERAPY EVALUATION    Patient: In Brionna Banuelos (76 y.o. male)  Date: 2/15/2021  Primary Diagnosis: Osteoarthritis of right knee [M17.11]  Procedure(s) (LRB):  RIGHT TOTAL KNEE REPLACEMENT, \"SPEC POP\" **CONTACT ISOLATIOIN** (Right) Day of Surgery   Precautions:  Fall, WBAT  WBAT  PLOF: Pt was independent with household and community mobility with no AD PTA. ASSESSMENT :  Based on the objective data described below, the patient presents with decr R LE strength and ROM, decr activity tolerance, and decr balance resulting in deficits in transfers and gait quality and tolerance. Pt seen with OT for additional set of skilled hands. Pt sitting in recliner on PT entry, rated R knee pain 0/10. Pt required CGA for sit <> stand transfers with RW. Pt ambulated 100ft in rae with RW and CGA demonstrating decr speed, progressing to reciprocal pattern. Pt ascended/descended 3 stairs with B HR and CGA with vc for sequencing. Pt educated on importance of early mobility, use of ice to decr pain and inflammation, and home safety. Vc for safety and appropriate use of RW provided t/o session. Pt left sitting in recliner with all needs met, all questions answered, and ice to R knee.  Pt would benefit from additional skilled therapy after discharge in order to incr functional mobility and promote return to PLOF. Recommend HHPT after discharge. Patient will benefit from skilled intervention to address the above impairments. Patient's rehabilitation potential is considered to be Good  Factors which may influence rehabilitation potential include:   [x]         None noted  []         Mental ability/status  []         Medical condition  []         Home/family situation and support systems  []         Safety awareness  []         Pain tolerance/management  []         Other:      PLAN :  Recommendations and Planned Interventions:   [x]           Bed Mobility Training             []    Neuromuscular Re-Education  [x]           Transfer Training                   []    Orthotic/Prosthetic Training  [x]           Gait Training                          [x]    Modalities  [x]           Therapeutic Exercises           []    Edema Management/Control  [x]           Therapeutic Activities            [x]    Family Training/Education  [x]           Patient Education  []           Other (comment):    Frequency/Duration: Patient will be followed by physical therapy twice daily to address goals. Discharge Recommendations: Home Health  Further Equipment Recommendations for Discharge: N/A     SUBJECTIVE:   Patient stated I feel okay, no pain.     OBJECTIVE DATA SUMMARY:     Past Medical History:   Diagnosis Date    Arthritis     GERD (gastroesophageal reflux disease)     High cholesterol     controlled with medication    Osteoarthritis of right knee 2/11/2021   History reviewed. No pertinent surgical history.   Barriers to Learning/Limitations: yes;  language and other post-anesthesia  Compensate with: Verbal Cues and Tactile Cues  Home Situation:  Home Situation  Home Environment: Private residence  # Steps to Enter: 2  Rails to Enter: Yes  Hand Rails : Left  One/Two Story Residence: Two story  Lift Chair Available: Yes  Living Alone: No  Support Systems: Family member(s)  Patient Expects to be Discharged to[de-identified] Private residence  Current DME Used/Available at Home: Ebony Rands, straight, Walker, rolling  Tub or Shower Type: Shower  Critical Behavior:  Neurologic State: Alert  Orientation Level: Oriented X4  Cognition: Appropriate for age attention/concentration; Follows commands  Safety/Judgement: Home safety; Fall prevention  Psychosocial  Patient Behaviors: Calm; Cooperative  Skin Condition/Temp: Dry;Warm  Skin Integrity: Incision (comment)(R KNEE)  Skin Integumentary  Skin Color: Appropriate for ethnicity  Skin Condition/Temp: Dry;Warm  Skin Integrity: Incision (comment)(R KNEE)  Functional Mobility:  Bed Mobility:  Scooting: Stand-by assistance  Transfers:  Sit to Stand: Contact guard assistance(vc)  Stand to Sit: Contact guard assistance(vc)  Balance:   Sitting: Intact  Standing: Intact; With support  Ambulation/Gait Training:  Distance (ft): 100 Feet (ft)  Assistive Device: Gait belt;Walker, rolling  Ambulation - Level of Assistance: Contact guard assistance(vc)  Gait Abnormalities: Decreased step clearance  Stance: Right decreased  Speed/Randee: Slow  Step Length: Left shortened;Right shortened  Interventions: Safety awareness training;Verbal cues; Visual/Demos  Stairs:  Number of Stairs Trained: 3  Stairs - Level of Assistance: Contact guard assistance(vc)  Rail Use: Both  Pain:  Pain level pre-treatment: 0/10   Pain level post-treatment: 0/10   Pain Intervention(s) : Medication (see MAR); Rest, Ice, Repositioning  Response to intervention: Nurse notified, See doc flow  Activity Tolerance:   Pt tolerated amb in rae with RW and CGA with no LOB, pt denied pain t/o session. Please refer to the flowsheet for vital signs taken during this treatment.   After treatment:   [x]         Patient left in no apparent distress sitting up in chair  []         Patient left in no apparent distress in bed  [x]         Call bell left within reach  [x]         Nursing notified  [] Caregiver present  []         Bed alarm activated  []         SCDs applied    COMMUNICATION/EDUCATION:   [x]         Role of Physical Therapy in the acute care setting. [x]         Fall prevention education was provided and the patient/caregiver indicated understanding. [x]         Patient/family have participated as able in goal setting and plan of care. [x]         Patient/family agree to work toward stated goals and plan of care. []         Patient understands intent and goals of therapy, but is neutral about his/her participation. []         Patient is unable to participate in goal setting/plan of care: ongoing with therapy staff.  []         Other:     Thank you for this referral.  Cheli Monroe   Time Calculation: 32 mins      Eval Complexity: History: LOW Complexity : Zero comorbidities / personal factors that will impact the outcome / POCExam:LOW Complexity : 1-2 Standardized tests and measures addressing body structure, function, activity limitation and / or participation in recreation  Presentation: LOW Complexity : Stable, uncomplicated  Clinical Decision Making:Low Complexity    Overall Complexity:LOW

## 2021-02-15 NOTE — PERIOP NOTES
Reviewed PTA medication list with patient/caregiver and patient/caregiver denies any additional medications. Patient admits to having a responsible adult care for them at home for at least 24 hours after surgery. Patient encouraged to use gown warming system and informed that using said warming gown to regulate body temperature prior to a procedure has been shown to help reduce the risks of blood clots and infection. Patient's pharmacy of choice verified and documented in PTA medication section. Dual skin assessment & fall risk band verification completed with Magnolia SOLER.

## 2021-02-15 NOTE — PROGRESS NOTES
[address] Same Day Discharge    Patient arrives to unit at this time. Dual skin assessment completed with Kim Castro RN, no abnormalities noted other than surgical incision to R KNEE. Optifoam dressing CDI, ice at site. Compression hose bilateral. Patient transitioned from stretcher to recliner. Patient oriented to room to include use of call bell, meal ordering, and use of incentive spirometer, patient able to get IS to 1500. Patient instructed to order lunch and given explanation of home for dinner plan. Phone and call bell left within reach. Educated on pain medication availability and possible side effects, as well as need to call for assistance before getting out of bed. Patient verbalized understanding.      Symptoms present on arrival:  [] Pain 0/10   [] Medicated  [] Nausea   [] Medicated   [] Hypotension/Hypertension   [] Provider notified

## 2021-02-15 NOTE — ANESTHESIA PROCEDURE NOTES
Peripheral Block    Start time: 2/15/2021 12:11 PM  End time: 2/15/2021 12:16 PM  Performed by: Sary Kumar MD  Authorized by: Sary Kumar MD       Pre-procedure:    Indications: at surgeon's request and post-op pain management    Preanesthetic Checklist: patient identified, risks and benefits discussed, site marked, timeout performed, anesthesia consent given and patient being monitored    Timeout Time: 12:05          Block Type:   Block Type:  Ipack  Laterality:  Right  Monitoring:  Standard ASA monitoring, continuous pulse ox, frequent vital sign checks, heart rate, oxygen and responsive to questions  Injection Technique:  Single shot  Procedures: ultrasound guided    Patient Position: supine  Prep: chlorhexidine    Location:  Lower thigh  Needle Type:  Stimuplex  Needle Gauge:  20 G  Needle Localization:  Ultrasound guidance    Assessment:  Number of attempts:  1  Injection Assessment:  Incremental injection every 5 mL, negative aspiration for blood, no intravascular symptoms, no paresthesia and ultrasound image on chart  Patient tolerance:  Patient tolerated the procedure well with no immediate complications

## 2021-02-15 NOTE — ANESTHESIA PROCEDURE NOTES
Peripheral Block    Start time: 2/15/2021 12:05 PM  End time: 2/15/2021 12:09 PM  Performed by: Kevin Armstrong MD  Authorized by: Kevin Armstrong MD       Pre-procedure: Indications: at surgeon's request and post-op pain management    Preanesthetic Checklist: patient identified, risks and benefits discussed, site marked, timeout performed, anesthesia consent given and patient being monitored    Timeout Time: 12:05          Block Type:   Block Type: Adductor canal  Laterality:  Right  Monitoring:  Standard ASA monitoring, continuous pulse ox, frequent vital sign checks, heart rate, oxygen and responsive to questions  Injection Technique:  Single shot  Procedures: ultrasound guided    Patient Position: supine  Prep: chlorhexidine    Location:  Mid thigh  Needle Type:  Stimuplex  Needle Gauge:  20 G  Needle Localization:  Ultrasound guidance  Medication Injected:  Ropivacaine (PF) (NAROPIN)(0.5%) 5 mg/mL injection, 22 mL    Assessment:  Number of attempts:  1  Injection Assessment:  Incremental injection every 5 mL, local visualized surrounding nerve on ultrasound, negative aspiration for blood, no intravascular symptoms, no paresthesia and ultrasound image on chart  Patient tolerance:  Patient tolerated the procedure well with no immediate complications  Precedex added to injectate (20mcg).

## 2021-02-15 NOTE — PERIOP NOTES
SSI bundle complete  Pt stated that he does not need , primary language is Pelaez , he is able to communicate and verbalize understanding in [place]. He is aware that at any time if he needs clarification language line can be initiated.

## 2021-02-15 NOTE — DISCHARGE SUMMARY
[address] [facility] [address]     DISCHARGE SUMMARY     PATIENT: In Brionna Banuelos     MRN: [id number]   ADMIT DATE: 2/15/2021   BILLIN   DISCHARGE DATE: 2/15/2021     ATTENDING: David Tracy MD   DICTATING: EVA Singer         ADMISSION DIAGNOSIS: Osteoarthritis of right knee [M17.11]    DISCHARGE DIAGNOSIS: Status post RIGHT TOTAL KNEE ARTHROPLASTY    HISTORY OF PRESENT ILLNESS: The patient is a 67y.o. year-old male   with ongoing right knee pain secondary to osteoarthritis of his right knee. The patient's pain has persisted and progressed despite conservative treatments and therapies. The patient has at this time opted for surgical intervention. PAST MEDICAL HISTORY:   Past Medical History:   Diagnosis Date    Arthritis     GERD (gastroesophageal reflux disease)     High cholesterol     controlled with medication    Osteoarthritis of right knee 2021       PAST SURGICAL HISTORY: History reviewed. No pertinent surgical history. ALLERGIES: No Known Allergies     CURRENT MEDICATIONS:  A list of medications prior to the time of admission include:  Prior to Admission medications    Medication Sig Start Date End Date Taking? Authorizing Provider   aspirin delayed-release 81 mg tablet Take 1 Tab by mouth two (2) times a day for 21 days. 2/15/21 3/8/21 Yes Crockett, Lovely Denver A, PA   meloxicam (Mobic) 7.5 mg tablet Take 1 Tab by mouth two (2) times a day for 14 days. 2/15/21 3/1/21 Yes Crockett, Lovely Denver A, PA   oxyCODONE IR (ROXICODONE) 5 mg immediate release tablet Take 1 Tab by mouth every four (4) hours as needed for Pain for up to 7 days. Max Daily Amount: 30 mg. 2/15/21 2/22/21 Yes Crockett, Lovely Denver A, PA   cefadroxil (DURICEF) 500 mg capsule Take 1 Cap by mouth two (2) times a day for 5 days.  2/15/21 2/20/21 Yes Crockett, Lovely Denver A, PA   omeprazole (PRILOSEC) 20 mg capsule TAKE 1 CAPSULE BY MOUTH EVERY DAY 21  Yes Provider, Historical   pravastatin (PRAVACHOL) 80 mg tablet Take 40 mg by mouth daily. 1/18/21  Yes Provider, Historical   acetaminophen (TYLENOL) 500 mg tablet Take 500 mg by mouth every six (6) hours as needed for Fever or Pain. Provider, Historical   mupirocin calcium (Bactroban NasaL) 2 % nasal ointment by Both Nostrils route two (2) times a day. Completed 5 day course    Provider, Historical   multivitamin (ONE A DAY) tablet Take 1 Tab by mouth daily. Provider, Historical       FAMILY HISTORY: History reviewed. No pertinent family history. SOCIAL HISTORY:   Social History     Socioeconomic History    Marital status:      Spouse name: Not on file    Number of children: Not on file    Years of education: Not on file    Highest education level: Not on file   Tobacco Use    Smoking status: Never Smoker    Smokeless tobacco: Never Used   Substance and Sexual Activity    Alcohol use: Not Currently    Drug use: Not Currently       REVIEW OF SYSTEMS: All review of systems are negative. PHYSICAL EXAMINATION: For a detailed physical exam, please refer to the patient's chart. HOSPITAL COURSE: The patient was taken to surgery the day of admission. he underwent a right total knee replacement. Operative course was benign. Estimated blood loss was approximately 150 cc. The patient was taken to the PACU in stable condition and was later taken to the floor in stable condition. During his hospital stay, the patient progressed well with physical therapy and occupational therapy, adherent to instructions. he had been cleared by physical therapy with stair training. he was placed on Aspirin for DVT prophylaxis. his pain has been well controlled with oral pain medications. his vitals have remained stable. he has also remained hemodynamically stable. The patient has been recommended for discharge home.      DISCHARGE INSTRUCTIONS: The patient is to be discharged home with the following medication changes: Discharge Medication List as of 2/15/2021  4:43 PM      START taking these medications    Details   aspirin delayed-release 81 mg tablet Take 1 Tab by mouth two (2) times a day for 21 days. , Normal, Disp-42 Tab, R-0      meloxicam (Mobic) 7.5 mg tablet Take 1 Tab by mouth two (2) times a day for 14 days. , Normal, Disp-28 Tab, R-0      oxyCODONE IR (ROXICODONE) 5 mg immediate release tablet Take 1 Tab by mouth every four (4) hours as needed for Pain for up to 7 days. Max Daily Amount: 30 mg., Normal, Disp-42 Tab, R-0      cefadroxil (DURICEF) 500 mg capsule Take 1 Cap by mouth two (2) times a day for 5 days. , Normal, Disp-10 Cap, R-0         CONTINUE these medications which have NOT CHANGED    Details   mupirocin calcium (Bactroban NasaL) 2 % nasal ointment by Both Nostrils route two (2) times a day. Completed 5 day course, Historical Med      omeprazole (PRILOSEC) 20 mg capsule TAKE 1 CAPSULE BY MOUTH EVERY DAY, Historical Med      pravastatin (PRAVACHOL) 80 mg tablet Take 80 mg by mouth daily. , Historical Med      multivitamin (ONE A DAY) tablet Take 1 Tab by mouth daily. , Historical Med         STOP taking these medications       krill oil 500 mg cap Comments:   Reason for Stopping:                 The patient is to continue at home with home physical therapy 3 times a week to work on gait training, range of motion, strengthening, and weightbearing exercises as tolerated on his right lower extremity. The patient is to progress from a walker to a cane to complete total weightbearing as tolerable. The patient is to continue to keep his incision dry. The patient is to followup with Dr. Whitney Galicia, Collin Hurtado PA-C and/or [facility] in the office approximately 10-14 days status post for x-rays and further evaluation.     EVA Roman  2/17/2021

## 2021-02-15 NOTE — DISCHARGE INSTRUCTIONS
[address] [facility]   Patient Discharge Instructions    In Woody So / [id number] : 1945    Admitted 2/15/2021 Discharged: 2/15/2021     IF YOU HAVE ANY PROBLEMS ONCE YOU ARE AT HOME CALL THE FOLLOWING NUMBERS:   Main office number: [phone]    Your follow up appointment to see either Dr. Clara Moffett PA-C, or [facility] as scheduled in 2 weeks. If you are unsure of your appointment date call the office at [phone]. Medication Instructions     · Resume your home medictions as directed, you may have directed not to resume supplements until after your follow up. · A prescription for pain medication has been given   · It is important that you take the medication exactly as they are prescribed. · Keep your medication in the bottles provided by the pharmacist and keep a list of the medication names, dosages, and times to be taken in your wallet. · Do not take other medications without consulting your doctor. What to do at [address] [place] [address] Ave your prehospital diet. If you have excessive nausea or vomitting call your doctor's office. Be sure to maintain adequate fluid intake. Some pain medications may cause constipation. Remember to drink fluids, stay as active as possible, and eat plenty of fiber-rich foods. Begin In-Home Physical Therapy; 3 times a week to work on gait training, range of motion, strengthening, and weight bearing exercises as tolerable. Continue to use your walker or cane when walking. May progress from the walker to a cane to complete total bearing as tolerable. Patient may shower. Wrap incision with plastic wrap/covering to prevent incision from getting wet. Avoid complete immersion. YOUR DRESSING SHOULD BE CHANGED BY YOUR HOME HEALTH NURSE 5-7 AFTER SURGERY ACCORDING TO THE DATE WRITTEN ON YOUR DRESSING.       When to Call    - Call if you have a temperature greater then 101  - Unable to keep food down  - Are unable to bear any wieght   - Need a pain medication refill     Information obtained by :  I understand that if any problems occur once I am at home I am to contact my physician. I understand and acknowledge receipt of the instructions indicated above.                                                                                                                                            Physician's or R.N.'s Signature                                                                  Date/Time                                                                                                                                              Patient or Representative Signature                                                          Date/Time

## 2021-02-15 NOTE — PROGRESS NOTES
Problem: Self Care Deficits Care Plan (Adult)  Goal: *Acute Goals and Plan of Care (Insert Text)  Description: Initial Occupational Therapy Goals (2/15/2021) Within 7 day(s):    1. Patient will perform grooming standing sinkside with supervision for increased independence with ADLs. 2. Patient will perform LB dressing with supervision & A/E PRN for increased independence with ADLs. 3. Patient will perform toilet transfer with supervision for increased independence with ADLs. 4. Patient will perform all aspects of toileting with supervision for increased independence with ADLs. 5. Patient will independently apply energy conservation techniques with 1 verbal cue(s)for increased independence with ADLs. 6. Patient will perform bathroom mobility with supervision for increased independence/safety with ADLs. Outcome: Progressing Towards Goal   OCCUPATIONAL THERAPY EVALUATION    Patient: In Brionna Banuelos (76 y.o. male)  Date: 2/15/2021  Primary Diagnosis: Osteoarthritis of right knee [M17.11]  Procedure(s) (LRB):  RIGHT TOTAL KNEE REPLACEMENT, \"SPEC POP\" **CONTACT ISOLATIOIN** (Right) Day of Surgery   Precautions: Fall, WBAT  PLOF: pt independent for ADLs/functional mobility    ASSESSMENT AND RECOMMENDATIONS:  Based on the objective data described below, the patient presents with RLE decreased ROM and strength affecting LE ADLs. Pt found seated in recliner chair, vitals assessed and WNL, pt reporting pain 0/10. Pt completed upper body dressing with supervision seated in chair. Patient able to utilize LLE ankle-over-knee technique and bending forward with RLE for sock donning. Pt able to thread B feet through underwear/pants without assist, and CGA when standing to pull up to waist. Pt CGA/SBA for STS/bathroom mobility with vc for proper body mechanics. Pt voided small amount standing at toilet with SBA. Pt ambulated back to recliner, ice applied to R knee.  Provided opportunity for pt to voice questions on ADL performance when home, pt has no further concerns. Patient will benefit from skilled Occupational Therapy intervention to maximize safety/independence with ADLs at d/c.    Education: Reviewed home safety, body mechanics, importance of moving every hour to prevent joint stiffness, role of ice for edema/pain control, Rolling Walker management/safety, and adaptive dressing techniques with patient verbalizing  understanding at this time     Patient will benefit from skilled intervention to address the above impairments. Patient's rehabilitation potential is considered to be Good  Factors which may influence rehabilitation potential include:   [x]             None noted  []             Mental ability/status  []             Medical condition  []             Home/family situation and support systems  []             Safety awareness  []             Pain tolerance/management  []             Other:        PLAN :  Recommendations and Planned Interventions:   [x]               Self Care Training                  [x]      Therapeutic Activities  [x]               Functional Mobility Training   []      Cognitive Retraining  []               Therapeutic Exercises           []      Endurance Activities  []               Balance Training                    []      Neuromuscular Re-Education  []               Visual/Perceptual Training     [x]      Home Safety Training  [x]               Patient Education                   [x]      Family Training/Education  []               Other (comment):    Frequency/Duration: Patient will be followed by Occupational Therapy 1-2 times per day/4-7 days per week to address goals. Discharge Recommendations: Home health with adult supervision at least 24 hours after d/c  Further Equipment Recommendations for Discharge: N/A     SUBJECTIVE:   Patient stated i'm not having any pain.     OBJECTIVE DATA SUMMARY:     Past Medical History:   Diagnosis Date    Arthritis     GERD (gastroesophageal reflux disease) High cholesterol     controlled with medication    Osteoarthritis of right knee 2/11/2021   History reviewed. No pertinent surgical history. Barriers to Learning/Limitations: yes;  physical and altered mental status (i.e.Sedation, Confusion)  Compensate with: visual, verbal, tactile, kinesthetic cues/model    Home Situation/Prior Level of Function: pt independent for ADLs/functional mobility  Home Situation  Home Environment: Private residence  # Steps to Enter: 2  Rails to Enter: Yes  Hand Rails : Left  One/Two Story Residence: Two story  Lift Chair Available: Yes  Living Alone: No  Support Systems: Family member(s)  Patient Expects to be Discharged to[de-identified] Private residence  Current DME Used/Available at Home: Cane, straight, Walker, rolling  Tub or Shower Type: Shower  []  Right hand dominant   []  Left hand dominant    Cognitive/Behavioral Status:  Neurologic State: Alert  Orientation Level: Oriented X4  Cognition: Follows commands  Safety/Judgement: Awareness of environment    Skin: R knee incision w/ Mepilex   Edema: compression hose in place & applied ice     Coordination: BUE  Coordination: Within functional limits  Fine Motor Skills-Upper: Left Intact; Right Intact    Gross Motor Skills-Upper: Left Intact; Right Intact    Balance:  Sitting: Intact  Standing: Intact; With support    Strength: BUE  Strength: Generally decreased, functional    Tone & Sensation:BUE  Tone: Normal  Sensation: Impaired(R knee)    Range of Motion: BUE  AROM: Generally decreased, functional    Functional Mobility and Transfers for ADLs:  Bed Mobility:  Scooting: Stand-by assistance    Transfers:  Sit to Stand: Contact guard assistance(vc)   Bathroom Mobility: Contact guard assistance(vc)    ADL Assessment:  Feeding: Modified independent  Oral Facial Hygiene/Grooming: Stand-by assistance  Bathing: Contact guard assistance  Upper Body Dressing: Supervision  Lower Body Dressing: Contact guard assistance  Toileting: Stand by assistance    ADL Intervention  Upper Body Dressing Assistance  Dressing Assistance: Supervision  Pullover Shirt: Supervision    Lower Body Dressing Assistance  Dressing Assistance: Contact guard assistance  Underpants: Contact guard assistance  Pants With Elastic Waist: Contact guard assistance  Leg Crossed Method Used: No  Position Performed: Seated in chair  Cues: Verbal cues provided;Visual cues provided    Toileting  Toileting Assistance: Contact guard assistance  Clothing Management: Contact guard assistance    Cognitive Retraining  Safety/Judgement: Awareness of environment    Pain:  Pain level pre-treatment: 0/10  Pain level post-treatment: 0/10  Pain Intervention(s): Medication administer by Nursing (see MAR); Rest, Ice, Repositioning   Response to intervention: Nurse notified, see doc flow     Activity Tolerance:   Fair. Patient able to stand ~5 minute(s). Patient able to complete ADLs with intermittent rest breaks. Patient limited by strength, ROM. Patient unsteady. Please refer to the flowsheet for vital signs taken during this treatment. After treatment:   [x]  Patient left in no apparent distress sitting up in chair  []  Patient sitting on EOB  []  Patient left in no apparent distress in bed  [x]  Call bell left within reach  [x]  Nursing notified  []  Caregiver present  [x]  Ice applied  []  SCD's on while back in bed  [] Bed alarm activated    COMMUNICATION/EDUCATION:   Communication/Collaboration:  [x]       Role of Occupational Therapy in the acute care setting. [x]      Home safety education was provided and the patient/caregiver indicated understanding. [x]      Patient/family have participated as able in goal setting and plan of care. [x]      Patient/family agree to work toward stated goals and plan of care. []      Patient understands intent and goals of therapy, but is neutral about his/her participation.   []      Patient is unable to participate in plan of care at this time.    Thank you for this referral.  Brenda Nelson OTR/L  Time Calculation: 32 mins    Eval Complexity: History: MEDIUM Complexity : Expanded review of history including physical, cognitive and psychosocial  history ; Examination: LOW Complexity : 1-3 performance deficits relating to physical, cognitive , or psychosocial skils that result in activity limitations and / or participation restrictions ;    Decision Making:LOW Complexity : No comorbidities that affect functional and no verbal or physical assistance needed to complete eval tasks

## 2021-02-15 NOTE — OP NOTES
[address] [facility] [address] Medicine  Total Knee Arthroplasty    Patient: In Odell Goltz So MRN: [id number]  SSN: [id number]    YOB: 1945  Age: 67 y.o. Sex: male      Date of Surgery: 2/15/2021   Preoperative Diagnosis: UNILATERAL PRIMARY OSTEOARTHRITIS RIGHT KNEE   Postoperative Diagnosis: UNILATERAL PRIMARY OSTEOARTHRITIS RIGHT KNEE   [facility]  Surgeon: Cira Soto MD  Assistant: Minor Price PA-C    Anesthesia: General and adductor canal Nerve Block    Procedure: Total Knee Arthroplasty:   The complexity of the total joint surgery requires the use of a first assistant for positioning, retraction and assistance in closure. Tourniquet Time: Tourniquet not used. Estimated Blood Loss: Less than 100cc     Implants:   Implant Name Type Inv. Item Serial No.  Lot No. LRB No. Used Action   CEMENT BNE 40GM FULL DOSE PMMA W/O ANTIBIO HI VISC N RADPQ - [id number]  CEMENT BNE 40GM FULL DOSE PMMA W/O ANTIBIO HI VISC N RADPQ  [id number] [id number] Right 2 Implanted   COMPONENT FEM SZ 6 R KNEE NP CRUCE RET BKS TRIMAX - [id number]  COMPONENT FEM SZ 6 R KNEE NP CRUCE RET BKS TRIMAX  [employer] ScalIT [id number] Right 1 Implanted   INSERT TIB SZ 6 THK9MM CRUCE RET BKS E VITALIZE - [id number]  INSERT TIB SZ 6 THK9MM CRUCE RET BKS E VITALIZE  ORTH[employer] DEVELOPMENT ScalIT [id number] Right 1 Implanted   COMPONENT PATELLAR 35 MM KNEE VITAMIN-E - [id number]  COMPONENT PATELLAR 35 MM KNEE VITAMIN-E  ORTHO DEVELOPMENT ScalIT [id number] Right 1 Implanted   TRAY TIB SZ 6 NP A BAL KNEE - [id number]  TRAY TIB SZ 6 NP A BAL KNEE  [employer]OP[employer] [id number] Right 1 Implanted        Specimens: None    Additional Findings: None     Complications: none    Body Mass Index: Body mass index is 26.35 kg/m².     Procedure Detail:  Prior to the surgery the patient was administered a femoral nerve block in the preoperative holding area by the anesthesiologist. In Brionna So was brought to the operating room and positioned on the operating table. He was anesthetized with anesthesia. Intravenous antibiotics were administered. Prior to the incision being made a timeout was called identifying the patient, procedure, operative side, and surgeon. A pneumatic tourniquet was placed about the limb and the right leg was prepped and draped in the usual sterile manner. The tourniquet was not inflated throughout the case. A midline anterior incision made over the knee. The incision was carried down through the subcutaneous tissue to the underlying capsule. A subvastus capsular incision was performed. The medial capsular flap was carefully elevated around to the posterior medial corner protecting the medial collateral ligaments and the fibers. The patella was sized with a caliper, and approximately 10-12 mm was resected with an oscillating saw allowing the patella to be slid into the lateral gutter. It was not everted throughout the case. Our attention was first turned to the distal femur and using intermedullary instrumentation, a 5-degree valgus cut was on the distal end of the femur. The distal end of the femur was sized to a size 6 femoral component. Pins were inserted through the sizer and the corresponding 4-in-1 block was slid into place and pinned for stability. Anterior and posterior and chamfer cuts were made to accommodate the femoral component. The medial and lateral menisci were excised as were the anterior cruciate ligaments. Our attention was then turned to the tibia. Using extramedullary instrumentation, a 3-degree cut was made on the proximal end of the tibia. A spacer block was placed to show gaps had been balanced and a size 6  tibial base plate was placed on the tibia and pinned into place. Intramedullary reaming guide was placed on the tibia and the appropriate reamer was used followed by the keel punch to complete the preparation of the tibia.  A trial femoral component was then impacted on to the distal end of the femur. Trial reduction was then performed with incremental size trial bearing surfaces. The orthodevelopment BKS trimax CR 9mm bearing surface was inserted and allowed for full extension, good medial, lateral stability at 90 degrees of flexion, especially medially. Our attention was then turned to the patella. The patella was sized to a 35mm patella. The guide was pinned, placed over patella, 3 holes were drilled. Trial patella button was inserted and the patella was reduced in the knee. The patella tracked normally using no-touch technique. The trial components were then all removed. The real components were opened on the back. The cut surfaces of the bone were prepared using the PulsaVac lavage. Prior to this, the Aquamantys was used to cauterize any soft tissue bleeding. 40 grams of FRM Study Courseuy HV cement was mixed. The femoral and tibial components  and patellar component was cemented into place. Excess cement was removed from around the edge of bone using plastic curette. Once the cement had hardened, the knee was placed through range of motion and noted to be stable as mentioned above with the trail components. The wound was dry, therefore no drain was used. The operative knee was injected with 20 cc of exparel. The knee was then soaked with a diluted betadine solution for approximately 3 min. This was then thoroughly irrigated. The capsular layer was closed using a #2 stratafix suture with the knee flexed 90 degrees, while subcutaneous layers were closed using 2-0 Vicryl suture. Finally the skin was closed using Prineo, which were applied in occlusive fashion and sterile bandage applied. All sponge and needle counts were correct. He was taken to the recovery room extubated in stable condition.     Signed By: Pat Espino MD     February 15, 2021

## 2021-02-16 ENCOUNTER — HOME CARE VISIT (OUTPATIENT)
Dept: SCHEDULING | Facility: HOME HEALTH | Age: 76
End: 2021-02-16
Payer: MEDICARE

## 2021-02-16 ENCOUNTER — HOME CARE VISIT (OUTPATIENT)
Dept: HOME HEALTH SERVICES | Facility: HOME HEALTH | Age: 76
End: 2021-02-16
Payer: MEDICARE

## 2021-02-16 ENCOUNTER — TELEPHONE (OUTPATIENT)
Dept: OTHER | Age: 76
End: 2021-02-16

## 2021-02-16 VITALS
RESPIRATION RATE: 16 BRPM | DIASTOLIC BLOOD PRESSURE: 72 MMHG | SYSTOLIC BLOOD PRESSURE: 124 MMHG | TEMPERATURE: 98.7 F | HEART RATE: 88 BPM | OXYGEN SATURATION: 97 %

## 2021-02-16 PROCEDURE — G0151 HHCP-SERV OF PT,EA 15 MIN: HCPCS

## 2021-02-16 PROCEDURE — 3331090002 HH PPS REVENUE DEBIT

## 2021-02-16 PROCEDURE — 400013 HH SOC

## 2021-02-16 PROCEDURE — 400018 HH-NO PAY CLAIM PROCEDURE

## 2021-02-16 PROCEDURE — 3331090001 HH PPS REVENUE CREDIT

## 2021-02-17 ENCOUNTER — HOME CARE VISIT (OUTPATIENT)
Dept: SCHEDULING | Facility: HOME HEALTH | Age: 76
End: 2021-02-17
Payer: MEDICARE

## 2021-02-17 ENCOUNTER — HOME CARE VISIT (OUTPATIENT)
Dept: HOME HEALTH SERVICES | Facility: HOME HEALTH | Age: 76
End: 2021-02-17
Payer: MEDICARE

## 2021-02-17 VITALS
SYSTOLIC BLOOD PRESSURE: 112 MMHG | HEART RATE: 82 BPM | OXYGEN SATURATION: 97 % | TEMPERATURE: 99.2 F | DIASTOLIC BLOOD PRESSURE: 62 MMHG

## 2021-02-17 VITALS
OXYGEN SATURATION: 97 % | DIASTOLIC BLOOD PRESSURE: 80 MMHG | RESPIRATION RATE: 16 BRPM | HEART RATE: 100 BPM | SYSTOLIC BLOOD PRESSURE: 122 MMHG

## 2021-02-17 PROCEDURE — G0299 HHS/HOSPICE OF RN EA 15 MIN: HCPCS

## 2021-02-17 PROCEDURE — 3331090002 HH PPS REVENUE DEBIT

## 2021-02-17 PROCEDURE — 3331090001 HH PPS REVENUE CREDIT

## 2021-02-17 PROCEDURE — A6213 FOAM DRG >16<=48 SQ IN W/BDR: HCPCS

## 2021-02-17 PROCEDURE — G0157 HHC PT ASSISTANT EA 15: HCPCS

## 2021-02-18 PROCEDURE — 3331090001 HH PPS REVENUE CREDIT

## 2021-02-18 PROCEDURE — 3331090002 HH PPS REVENUE DEBIT

## 2021-02-19 ENCOUNTER — HOME CARE VISIT (OUTPATIENT)
Dept: SCHEDULING | Facility: HOME HEALTH | Age: 76
End: 2021-02-19
Payer: MEDICARE

## 2021-02-19 PROCEDURE — 3331090001 HH PPS REVENUE CREDIT

## 2021-02-19 PROCEDURE — 3331090002 HH PPS REVENUE DEBIT

## 2021-02-19 PROCEDURE — G0157 HHC PT ASSISTANT EA 15: HCPCS

## 2021-02-20 PROCEDURE — 3331090002 HH PPS REVENUE DEBIT

## 2021-02-20 PROCEDURE — 3331090001 HH PPS REVENUE CREDIT

## 2021-02-21 VITALS
SYSTOLIC BLOOD PRESSURE: 133 MMHG | OXYGEN SATURATION: 95 % | DIASTOLIC BLOOD PRESSURE: 70 MMHG | TEMPERATURE: 98.6 F | HEART RATE: 83 BPM

## 2021-02-21 PROCEDURE — 3331090002 HH PPS REVENUE DEBIT

## 2021-02-21 PROCEDURE — 3331090001 HH PPS REVENUE CREDIT

## 2021-02-22 ENCOUNTER — HOME CARE VISIT (OUTPATIENT)
Dept: SCHEDULING | Facility: HOME HEALTH | Age: 76
End: 2021-02-22
Payer: MEDICARE

## 2021-02-22 VITALS
SYSTOLIC BLOOD PRESSURE: 120 MMHG | OXYGEN SATURATION: 97 % | DIASTOLIC BLOOD PRESSURE: 78 MMHG | RESPIRATION RATE: 16 BRPM | TEMPERATURE: 98.8 F | HEART RATE: 62 BPM

## 2021-02-22 PROCEDURE — 3331090002 HH PPS REVENUE DEBIT

## 2021-02-22 PROCEDURE — G0157 HHC PT ASSISTANT EA 15: HCPCS

## 2021-02-22 PROCEDURE — 3331090001 HH PPS REVENUE CREDIT

## 2021-02-22 PROCEDURE — G0299 HHS/HOSPICE OF RN EA 15 MIN: HCPCS

## 2021-02-23 VITALS
HEART RATE: 74 BPM | SYSTOLIC BLOOD PRESSURE: 130 MMHG | OXYGEN SATURATION: 96 % | TEMPERATURE: 97.7 F | DIASTOLIC BLOOD PRESSURE: 72 MMHG

## 2021-02-23 PROCEDURE — 3331090001 HH PPS REVENUE CREDIT

## 2021-02-23 PROCEDURE — 3331090002 HH PPS REVENUE DEBIT

## 2021-02-24 ENCOUNTER — HOME CARE VISIT (OUTPATIENT)
Dept: SCHEDULING | Facility: HOME HEALTH | Age: 76
End: 2021-02-24
Payer: MEDICARE

## 2021-02-24 PROCEDURE — 3331090001 HH PPS REVENUE CREDIT

## 2021-02-24 PROCEDURE — 3331090002 HH PPS REVENUE DEBIT

## 2021-02-24 PROCEDURE — G0157 HHC PT ASSISTANT EA 15: HCPCS

## 2021-02-25 VITALS
HEART RATE: 72 BPM | SYSTOLIC BLOOD PRESSURE: 138 MMHG | DIASTOLIC BLOOD PRESSURE: 72 MMHG | OXYGEN SATURATION: 99 % | TEMPERATURE: 97.7 F

## 2021-02-25 PROCEDURE — 3331090001 HH PPS REVENUE CREDIT

## 2021-02-25 PROCEDURE — 3331090002 HH PPS REVENUE DEBIT

## 2021-02-26 ENCOUNTER — HOME CARE VISIT (OUTPATIENT)
Dept: SCHEDULING | Facility: HOME HEALTH | Age: 76
End: 2021-02-26
Payer: MEDICARE

## 2021-02-26 PROCEDURE — 3331090002 HH PPS REVENUE DEBIT

## 2021-02-26 PROCEDURE — G0157 HHC PT ASSISTANT EA 15: HCPCS

## 2021-02-26 PROCEDURE — 3331090001 HH PPS REVENUE CREDIT

## 2021-02-27 ENCOUNTER — HOME CARE VISIT (OUTPATIENT)
Dept: SCHEDULING | Facility: HOME HEALTH | Age: 76
End: 2021-02-27
Payer: MEDICARE

## 2021-02-27 PROCEDURE — 3331090001 HH PPS REVENUE CREDIT

## 2021-02-27 PROCEDURE — 3331090002 HH PPS REVENUE DEBIT

## 2021-02-27 PROCEDURE — G0299 HHS/HOSPICE OF RN EA 15 MIN: HCPCS

## 2021-02-28 VITALS
SYSTOLIC BLOOD PRESSURE: 140 MMHG | TEMPERATURE: 98.4 F | RESPIRATION RATE: 20 BRPM | DIASTOLIC BLOOD PRESSURE: 78 MMHG | HEART RATE: 71 BPM | OXYGEN SATURATION: 97 %

## 2021-02-28 PROCEDURE — 3331090001 HH PPS REVENUE CREDIT

## 2021-02-28 PROCEDURE — 3331090002 HH PPS REVENUE DEBIT

## 2021-02-28 NOTE — PROGRESS NOTES
Giovanni Shafer,     I just wanted to make you aware that Mr. Banuelos has been discharged from skilled nursing at this time due to goals being met. Dressing changed, incision looks good without signs of infection noted. PT to perform final discharge from home health services. [facility] [place] [facility].     Thank you,  Javi Hall RN

## 2021-03-01 ENCOUNTER — HOME CARE VISIT (OUTPATIENT)
Dept: SCHEDULING | Facility: HOME HEALTH | Age: 76
End: 2021-03-01
Payer: MEDICARE

## 2021-03-01 VITALS
TEMPERATURE: 97.8 F | HEART RATE: 74 BPM | RESPIRATION RATE: 16 BRPM | DIASTOLIC BLOOD PRESSURE: 68 MMHG | SYSTOLIC BLOOD PRESSURE: 128 MMHG | OXYGEN SATURATION: 98 %

## 2021-03-01 VITALS
OXYGEN SATURATION: 96 % | DIASTOLIC BLOOD PRESSURE: 70 MMHG | TEMPERATURE: 97.2 F | SYSTOLIC BLOOD PRESSURE: 140 MMHG | HEART RATE: 70 BPM

## 2021-03-01 PROCEDURE — 3331090001 HH PPS REVENUE CREDIT

## 2021-03-01 PROCEDURE — 3331090002 HH PPS REVENUE DEBIT

## 2021-03-01 PROCEDURE — G0151 HHCP-SERV OF PT,EA 15 MIN: HCPCS

## (undated) DEVICE — NEEDLE SPNL 20GA L3.5IN YEL HUB S STL REG WALL FIT STYL W/

## (undated) DEVICE — SUTURE STRATAFIX SYMMETRIC PDS + 1 SGL ARMED CT 18 IN LEN SXPP1A405

## (undated) DEVICE — ZIP 16 SURGICAL SKIN CLOSURE DEVICE: Brand: ZIP 16 SURGICAL SKIN CLOSURE DEVICE

## (undated) DEVICE — BLADE SAW W13XL90MM 1.19MM PARA

## (undated) DEVICE — SUT VCRL + 2-0 36IN CT1 UD --

## (undated) DEVICE — DRSG MEPILES BORDER AG 4X12 -- 5/BX

## (undated) DEVICE — PIN GUIDE FIX 3.2X62 MM SCREW [GS903A0620322P] [KOMET MEDICAL]

## (undated) DEVICE — Device

## (undated) DEVICE — SOLUTION IV 100ML 0.9% SOD CHL DIL INJ

## (undated) DEVICE — 3 BONE CEMENT MIXER: Brand: MIXEVAC

## (undated) DEVICE — NDL SPNE QNCKE 18GX3.5IN LF --

## (undated) DEVICE — SOL INJ L R 1000ML BG --

## (undated) DEVICE — THE CANADY HYBRID PLASMA SCALPEL IS AN ELECTROSURGICAL PLASMA SCALPEL THAT USES AN 85MM BENDABLE PADDLE BLADE TIP. THE ELECTROSURGICAL PLASMA SCALPEL IS USED TO SIMULTANEOUSLY CUT AND COAGULATE BIOLOGICAL TISSUE.: Brand: CANADY HYBRID PLASMA PADDLE BLADE

## (undated) DEVICE — SOL IRRIGATION INJ NACL 0.9% 500ML BTL

## (undated) DEVICE — SUT VCRL + 1 36IN CT1 VIO --

## (undated) DEVICE — HANDPIECE SET WITH HIGH FLOW TIP AND SUCTION TUBE: Brand: INTERPULSE

## (undated) DEVICE — PIN GUIDE FIX 3.2X62 MM SCREW [GS9030620324P] [KOMET MEDICAL]

## (undated) DEVICE — SOLUTION SCRB 4OZ 10% PVP I POVIDONE IOD TOP PAINT EXIDINE

## (undated) DEVICE — BLADE SAW 1.19X20X90 MM FOR LG BNE